# Patient Record
Sex: FEMALE | Employment: PART TIME
[De-identification: names, ages, dates, MRNs, and addresses within clinical notes are randomized per-mention and may not be internally consistent; named-entity substitution may affect disease eponyms.]

---

## 2020-01-31 LAB
HBV SURFACE AG SERPL QL IA: NEGATIVE
HIV 1+2 AB+HIV1 P24 AG SERPL QL IA: NEGATIVE
RUBELLA ABY IGG: NORMAL

## 2020-03-11 ENCOUNTER — HEALTH MAINTENANCE LETTER (OUTPATIENT)
Age: 30
End: 2020-03-11

## 2020-07-30 LAB — GROUP B STREP PCR: NEGATIVE

## 2020-08-24 DIAGNOSIS — Z34.90 ENCOUNTER FOR PLANNED INDUCTION OF LABOR: Primary | ICD-10-CM

## 2020-08-27 DIAGNOSIS — Z34.90 ENCOUNTER FOR PLANNED INDUCTION OF LABOR: ICD-10-CM

## 2020-08-27 PROCEDURE — U0003 INFECTIOUS AGENT DETECTION BY NUCLEIC ACID (DNA OR RNA); SEVERE ACUTE RESPIRATORY SYNDROME CORONAVIRUS 2 (SARS-COV-2) (CORONAVIRUS DISEASE [COVID-19]), AMPLIFIED PROBE TECHNIQUE, MAKING USE OF HIGH THROUGHPUT TECHNOLOGIES AS DESCRIBED BY CMS-2020-01-R: HCPCS | Performed by: OBSTETRICS & GYNECOLOGY

## 2020-08-28 LAB
SARS-COV-2 RNA SPEC QL NAA+PROBE: NOT DETECTED
SPECIMEN SOURCE: NORMAL

## 2020-08-29 ENCOUNTER — HOSPITAL ENCOUNTER (INPATIENT)
Facility: CLINIC | Age: 30
LOS: 4 days | Discharge: HOME | End: 2020-09-02
Attending: OBSTETRICS & GYNECOLOGY | Admitting: OBSTETRICS & GYNECOLOGY
Payer: COMMERCIAL

## 2020-08-29 LAB
ABO + RH BLD: NORMAL
ABO + RH BLD: NORMAL
SPECIMEN EXP DATE BLD: NORMAL

## 2020-08-29 PROCEDURE — 12000000 ZZH R&B MED SURG/OB

## 2020-08-29 PROCEDURE — 86901 BLOOD TYPING SEROLOGIC RH(D): CPT | Performed by: OBSTETRICS & GYNECOLOGY

## 2020-08-29 PROCEDURE — 86900 BLOOD TYPING SEROLOGIC ABO: CPT | Performed by: OBSTETRICS & GYNECOLOGY

## 2020-08-29 PROCEDURE — 25000132 ZZH RX MED GY IP 250 OP 250 PS 637: Performed by: OBSTETRICS & GYNECOLOGY

## 2020-08-29 PROCEDURE — 86780 TREPONEMA PALLIDUM: CPT | Performed by: OBSTETRICS & GYNECOLOGY

## 2020-08-29 RX ORDER — DIPHENHYDRAMINE HYDROCHLORIDE 50 MG/ML
25 INJECTION INTRAMUSCULAR; INTRAVENOUS EVERY 6 HOURS PRN
Status: DISCONTINUED | OUTPATIENT
Start: 2020-08-29 | End: 2020-09-02 | Stop reason: HOSPADM

## 2020-08-29 RX ORDER — METHYLERGONOVINE MALEATE 0.2 MG/ML
200 INJECTION INTRAVENOUS
Status: DISCONTINUED | OUTPATIENT
Start: 2020-08-29 | End: 2020-08-31 | Stop reason: CLARIF

## 2020-08-29 RX ORDER — IBUPROFEN 800 MG/1
800 TABLET, FILM COATED ORAL
Status: DISCONTINUED | OUTPATIENT
Start: 2020-08-29 | End: 2020-08-31 | Stop reason: CLARIF

## 2020-08-29 RX ORDER — DIPHENHYDRAMINE HCL 25 MG
25 CAPSULE ORAL EVERY 6 HOURS PRN
Status: DISCONTINUED | OUTPATIENT
Start: 2020-08-29 | End: 2020-09-02 | Stop reason: HOSPADM

## 2020-08-29 RX ORDER — NALOXONE HYDROCHLORIDE 0.4 MG/ML
.1-.4 INJECTION, SOLUTION INTRAMUSCULAR; INTRAVENOUS; SUBCUTANEOUS
Status: DISCONTINUED | OUTPATIENT
Start: 2020-08-29 | End: 2020-08-31

## 2020-08-29 RX ORDER — OXYCODONE AND ACETAMINOPHEN 5; 325 MG/1; MG/1
1 TABLET ORAL
Status: DISCONTINUED | OUTPATIENT
Start: 2020-08-29 | End: 2020-08-31 | Stop reason: CLARIF

## 2020-08-29 RX ORDER — OXYTOCIN 10 [USP'U]/ML
10 INJECTION, SOLUTION INTRAMUSCULAR; INTRAVENOUS
Status: DISCONTINUED | OUTPATIENT
Start: 2020-08-29 | End: 2020-08-31

## 2020-08-29 RX ORDER — TRANEXAMIC ACID 10 MG/ML
1 INJECTION, SOLUTION INTRAVENOUS EVERY 30 MIN PRN
Status: DISCONTINUED | OUTPATIENT
Start: 2020-08-29 | End: 2020-08-31 | Stop reason: CLARIF

## 2020-08-29 RX ORDER — ONDANSETRON 2 MG/ML
4 INJECTION INTRAMUSCULAR; INTRAVENOUS EVERY 6 HOURS PRN
Status: DISCONTINUED | OUTPATIENT
Start: 2020-08-29 | End: 2020-08-31

## 2020-08-29 RX ORDER — SODIUM CHLORIDE, SODIUM LACTATE, POTASSIUM CHLORIDE, CALCIUM CHLORIDE 600; 310; 30; 20 MG/100ML; MG/100ML; MG/100ML; MG/100ML
INJECTION, SOLUTION INTRAVENOUS CONTINUOUS
Status: DISCONTINUED | OUTPATIENT
Start: 2020-08-29 | End: 2020-08-31

## 2020-08-29 RX ORDER — LIDOCAINE 40 MG/G
CREAM TOPICAL
Status: DISCONTINUED | OUTPATIENT
Start: 2020-08-29 | End: 2020-08-31

## 2020-08-29 RX ORDER — CARBOPROST TROMETHAMINE 250 UG/ML
250 INJECTION, SOLUTION INTRAMUSCULAR
Status: DISCONTINUED | OUTPATIENT
Start: 2020-08-29 | End: 2020-08-31 | Stop reason: CLARIF

## 2020-08-29 RX ORDER — ACETAMINOPHEN 325 MG/1
650 TABLET ORAL EVERY 4 HOURS PRN
Status: DISCONTINUED | OUTPATIENT
Start: 2020-08-29 | End: 2020-08-31 | Stop reason: CLARIF

## 2020-08-29 RX ORDER — MISOPROSTOL 100 UG/1
25 TABLET ORAL
Status: DISCONTINUED | OUTPATIENT
Start: 2020-08-29 | End: 2020-08-30 | Stop reason: CLARIF

## 2020-08-29 RX ORDER — OXYTOCIN/0.9 % SODIUM CHLORIDE 30/500 ML
100-340 PLASTIC BAG, INJECTION (ML) INTRAVENOUS CONTINUOUS PRN
Status: COMPLETED | OUTPATIENT
Start: 2020-08-29 | End: 2020-08-30

## 2020-08-29 RX ORDER — FENTANYL CITRATE 50 UG/ML
50-100 INJECTION, SOLUTION INTRAMUSCULAR; INTRAVENOUS
Status: DISCONTINUED | OUTPATIENT
Start: 2020-08-29 | End: 2020-08-31 | Stop reason: CLARIF

## 2020-08-29 RX ADMIN — Medication 25 MCG: at 21:24

## 2020-08-29 RX ADMIN — Medication 25 MCG: at 23:40

## 2020-08-29 ASSESSMENT — ACTIVITIES OF DAILY LIVING (ADL)
TRANSFERRING: 0-->INDEPENDENT
BATHING: 0-->INDEPENDENT
TOILETING: 0-->INDEPENDENT
SWALLOWING: 0-->SWALLOWS FOODS/LIQUIDS WITHOUT DIFFICULTY
COGNITION: 0 - NO COGNITION ISSUES REPORTED
DRESS: 0-->INDEPENDENT
RETIRED_EATING: 0-->INDEPENDENT
FALL_HISTORY_WITHIN_LAST_SIX_MONTHS: NO
AMBULATION: 0-->INDEPENDENT
RETIRED_COMMUNICATION: 0-->UNDERSTANDS/COMMUNICATES WITHOUT DIFFICULTY

## 2020-08-29 ASSESSMENT — MIFFLIN-ST. JEOR: SCORE: 1881.21

## 2020-08-30 ENCOUNTER — ANESTHESIA EVENT (OUTPATIENT)
Dept: OBGYN | Facility: CLINIC | Age: 30
End: 2020-08-30
Payer: COMMERCIAL

## 2020-08-30 ENCOUNTER — ANESTHESIA (OUTPATIENT)
Dept: OBGYN | Facility: CLINIC | Age: 30
End: 2020-08-30
Payer: COMMERCIAL

## 2020-08-30 ENCOUNTER — ANESTHESIA EVENT (OUTPATIENT)
Dept: SURGERY | Facility: CLINIC | Age: 30
End: 2020-08-30
Payer: COMMERCIAL

## 2020-08-30 ENCOUNTER — ANESTHESIA (OUTPATIENT)
Dept: SURGERY | Facility: CLINIC | Age: 30
End: 2020-08-30
Payer: COMMERCIAL

## 2020-08-30 LAB — T PALLIDUM AB SER QL: NONREACTIVE

## 2020-08-30 PROCEDURE — 37000008 ZZH ANESTHESIA TECHNICAL FEE, 1ST 30 MIN: Performed by: OBSTETRICS & GYNECOLOGY

## 2020-08-30 PROCEDURE — 12000000 ZZH R&B MED SURG/OB

## 2020-08-30 PROCEDURE — 71000014 ZZH RECOVERY PHASE 1 LEVEL 2 FIRST HR: Performed by: OBSTETRICS & GYNECOLOGY

## 2020-08-30 PROCEDURE — 25000128 H RX IP 250 OP 636: Performed by: ANESTHESIOLOGY

## 2020-08-30 PROCEDURE — 40000671 ZZH STATISTIC ANESTHESIA CASE

## 2020-08-30 PROCEDURE — 36000058 ZZH SURGERY LEVEL 3 EA 15 ADDTL MIN: Performed by: OBSTETRICS & GYNECOLOGY

## 2020-08-30 PROCEDURE — 36000056 ZZH SURGERY LEVEL 3 1ST 30 MIN: Performed by: OBSTETRICS & GYNECOLOGY

## 2020-08-30 PROCEDURE — 25000125 ZZHC RX 250: Performed by: NURSE ANESTHETIST, CERTIFIED REGISTERED

## 2020-08-30 PROCEDURE — 25000128 H RX IP 250 OP 636: Performed by: OBSTETRICS & GYNECOLOGY

## 2020-08-30 PROCEDURE — 25000128 H RX IP 250 OP 636: Performed by: NURSE ANESTHETIST, CERTIFIED REGISTERED

## 2020-08-30 PROCEDURE — 25800030 ZZH RX IP 258 OP 636: Performed by: OBSTETRICS & GYNECOLOGY

## 2020-08-30 PROCEDURE — 25000125 ZZHC RX 250: Performed by: OBSTETRICS & GYNECOLOGY

## 2020-08-30 PROCEDURE — 00HU33Z INSERTION OF INFUSION DEVICE INTO SPINAL CANAL, PERCUTANEOUS APPROACH: ICD-10-PCS | Performed by: ANESTHESIOLOGY

## 2020-08-30 PROCEDURE — 37000011 ZZH ANESTHESIA WARD SERVICE

## 2020-08-30 PROCEDURE — 37000009 ZZH ANESTHESIA TECHNICAL FEE, EACH ADDTL 15 MIN: Performed by: OBSTETRICS & GYNECOLOGY

## 2020-08-30 PROCEDURE — 3E0R3BZ INTRODUCTION OF ANESTHETIC AGENT INTO SPINAL CANAL, PERCUTANEOUS APPROACH: ICD-10-PCS | Performed by: ANESTHESIOLOGY

## 2020-08-30 PROCEDURE — 25000132 ZZH RX MED GY IP 250 OP 250 PS 637: Performed by: OBSTETRICS & GYNECOLOGY

## 2020-08-30 PROCEDURE — 27210794 ZZH OR GENERAL SUPPLY STERILE: Performed by: OBSTETRICS & GYNECOLOGY

## 2020-08-30 RX ORDER — HYDROCORTISONE 2.5 %
CREAM (GRAM) TOPICAL 3 TIMES DAILY PRN
Status: DISCONTINUED | OUTPATIENT
Start: 2020-08-30 | End: 2020-09-02 | Stop reason: HOSPADM

## 2020-08-30 RX ORDER — SIMETHICONE 80 MG
80 TABLET,CHEWABLE ORAL 4 TIMES DAILY PRN
Status: DISCONTINUED | OUTPATIENT
Start: 2020-08-30 | End: 2020-09-02 | Stop reason: HOSPADM

## 2020-08-30 RX ORDER — OXYCODONE HYDROCHLORIDE 5 MG/1
5 TABLET ORAL EVERY 4 HOURS PRN
Status: DISCONTINUED | OUTPATIENT
Start: 2020-08-30 | End: 2020-09-02 | Stop reason: HOSPADM

## 2020-08-30 RX ORDER — ACETAMINOPHEN 325 MG/1
975 TABLET ORAL EVERY 6 HOURS
Status: DISCONTINUED | OUTPATIENT
Start: 2020-08-31 | End: 2020-09-02 | Stop reason: HOSPADM

## 2020-08-30 RX ORDER — NALOXONE HYDROCHLORIDE 0.4 MG/ML
.1-.4 INJECTION, SOLUTION INTRAMUSCULAR; INTRAVENOUS; SUBCUTANEOUS
Status: DISCONTINUED | OUTPATIENT
Start: 2020-08-30 | End: 2020-09-02 | Stop reason: HOSPADM

## 2020-08-30 RX ORDER — OXYTOCIN/0.9 % SODIUM CHLORIDE 30/500 ML
PLASTIC BAG, INJECTION (ML) INTRAVENOUS PRN
Status: DISCONTINUED | OUTPATIENT
Start: 2020-08-30 | End: 2020-08-30

## 2020-08-30 RX ORDER — AMOXICILLIN 250 MG
2 CAPSULE ORAL 2 TIMES DAILY
Status: DISCONTINUED | OUTPATIENT
Start: 2020-08-31 | End: 2020-09-02 | Stop reason: HOSPADM

## 2020-08-30 RX ORDER — OXYTOCIN 10 [USP'U]/ML
10 INJECTION, SOLUTION INTRAMUSCULAR; INTRAVENOUS
Status: DISCONTINUED | OUTPATIENT
Start: 2020-08-30 | End: 2020-09-02 | Stop reason: HOSPADM

## 2020-08-30 RX ORDER — KETOROLAC TROMETHAMINE 30 MG/ML
30 INJECTION, SOLUTION INTRAMUSCULAR; INTRAVENOUS EVERY 6 HOURS
Status: DISCONTINUED | OUTPATIENT
Start: 2020-08-31 | End: 2020-08-31

## 2020-08-30 RX ORDER — CEFAZOLIN SODIUM 2 G/100ML
2 INJECTION, SOLUTION INTRAVENOUS
Status: DISCONTINUED | OUTPATIENT
Start: 2020-08-30 | End: 2020-08-30 | Stop reason: HOSPADM

## 2020-08-30 RX ORDER — CEFAZOLIN SODIUM 1 G/3ML
INJECTION, POWDER, FOR SOLUTION INTRAMUSCULAR; INTRAVENOUS PRN
Status: DISCONTINUED | OUTPATIENT
Start: 2020-08-30 | End: 2020-08-30

## 2020-08-30 RX ORDER — ONDANSETRON 2 MG/ML
INJECTION INTRAMUSCULAR; INTRAVENOUS PRN
Status: DISCONTINUED | OUTPATIENT
Start: 2020-08-30 | End: 2020-08-30

## 2020-08-30 RX ORDER — FENTANYL CITRATE-0.9 % NACL/PF 10 MCG/ML
100 PLASTIC BAG, INJECTION (ML) INTRAVENOUS EVERY 5 MIN PRN
Status: DISCONTINUED | OUTPATIENT
Start: 2020-08-30 | End: 2020-08-31 | Stop reason: CLARIF

## 2020-08-30 RX ORDER — CEFAZOLIN SODIUM 1 G/3ML
1 INJECTION, POWDER, FOR SOLUTION INTRAMUSCULAR; INTRAVENOUS SEE ADMIN INSTRUCTIONS
Status: DISCONTINUED | OUTPATIENT
Start: 2020-08-30 | End: 2020-08-30 | Stop reason: HOSPADM

## 2020-08-30 RX ORDER — ONDANSETRON 2 MG/ML
4 INJECTION INTRAMUSCULAR; INTRAVENOUS EVERY 6 HOURS PRN
Status: DISCONTINUED | OUTPATIENT
Start: 2020-08-30 | End: 2020-08-31

## 2020-08-30 RX ORDER — MODIFIED LANOLIN
OINTMENT (GRAM) TOPICAL
Status: DISCONTINUED | OUTPATIENT
Start: 2020-08-30 | End: 2020-09-02 | Stop reason: HOSPADM

## 2020-08-30 RX ORDER — LIDOCAINE 40 MG/G
CREAM TOPICAL
Status: DISCONTINUED | OUTPATIENT
Start: 2020-08-30 | End: 2020-09-02 | Stop reason: HOSPADM

## 2020-08-30 RX ORDER — NALOXONE HYDROCHLORIDE 0.4 MG/ML
.1-.4 INJECTION, SOLUTION INTRAMUSCULAR; INTRAVENOUS; SUBCUTANEOUS
Status: DISCONTINUED | OUTPATIENT
Start: 2020-08-30 | End: 2020-08-31

## 2020-08-30 RX ORDER — NALBUPHINE HYDROCHLORIDE 20 MG/ML
2.5-5 INJECTION, SOLUTION INTRAMUSCULAR; INTRAVENOUS; SUBCUTANEOUS EVERY 6 HOURS PRN
Status: DISCONTINUED | OUTPATIENT
Start: 2020-08-30 | End: 2020-08-31 | Stop reason: CLARIF

## 2020-08-30 RX ORDER — IBUPROFEN 800 MG/1
800 TABLET, FILM COATED ORAL EVERY 6 HOURS
Status: DISCONTINUED | OUTPATIENT
Start: 2020-08-31 | End: 2020-08-31

## 2020-08-30 RX ORDER — SCOLOPAMINE TRANSDERMAL SYSTEM 1 MG/1
1 PATCH, EXTENDED RELEASE TRANSDERMAL
Status: DISCONTINUED | OUTPATIENT
Start: 2020-08-30 | End: 2020-08-31 | Stop reason: CLARIF

## 2020-08-30 RX ORDER — DEXTROSE, SODIUM CHLORIDE, SODIUM LACTATE, POTASSIUM CHLORIDE, AND CALCIUM CHLORIDE 5; .6; .31; .03; .02 G/100ML; G/100ML; G/100ML; G/100ML; G/100ML
INJECTION, SOLUTION INTRAVENOUS CONTINUOUS
Status: DISCONTINUED | OUTPATIENT
Start: 2020-08-31 | End: 2020-09-02 | Stop reason: HOSPADM

## 2020-08-30 RX ORDER — SODIUM CHLORIDE, SODIUM LACTATE, POTASSIUM CHLORIDE, CALCIUM CHLORIDE 600; 310; 30; 20 MG/100ML; MG/100ML; MG/100ML; MG/100ML
INJECTION, SOLUTION INTRAVENOUS CONTINUOUS
Status: DISCONTINUED | OUTPATIENT
Start: 2020-08-30 | End: 2020-08-30 | Stop reason: HOSPADM

## 2020-08-30 RX ORDER — CITRIC ACID/SODIUM CITRATE 334-500MG
SOLUTION, ORAL ORAL
Status: DISCONTINUED
Start: 2020-08-30 | End: 2020-08-30 | Stop reason: HOSPADM

## 2020-08-30 RX ORDER — BISACODYL 10 MG
10 SUPPOSITORY, RECTAL RECTAL DAILY PRN
Status: DISCONTINUED | OUTPATIENT
Start: 2020-09-01 | End: 2020-09-02 | Stop reason: HOSPADM

## 2020-08-30 RX ORDER — OXYTOCIN/0.9 % SODIUM CHLORIDE 30/500 ML
100 PLASTIC BAG, INJECTION (ML) INTRAVENOUS CONTINUOUS
Status: DISCONTINUED | OUTPATIENT
Start: 2020-08-31 | End: 2020-09-02 | Stop reason: HOSPADM

## 2020-08-30 RX ORDER — MORPHINE SULFATE 1 MG/ML
INJECTION, SOLUTION EPIDURAL; INTRATHECAL; INTRAVENOUS PRN
Status: DISCONTINUED | OUTPATIENT
Start: 2020-08-30 | End: 2020-08-30

## 2020-08-30 RX ORDER — OXYTOCIN/0.9 % SODIUM CHLORIDE 30/500 ML
340 PLASTIC BAG, INJECTION (ML) INTRAVENOUS CONTINUOUS PRN
Status: DISCONTINUED | OUTPATIENT
Start: 2020-08-30 | End: 2020-09-02 | Stop reason: HOSPADM

## 2020-08-30 RX ORDER — EPHEDRINE SULFATE 50 MG/ML
5 INJECTION, SOLUTION INTRAMUSCULAR; INTRAVENOUS; SUBCUTANEOUS
Status: DISCONTINUED | OUTPATIENT
Start: 2020-08-30 | End: 2020-08-31

## 2020-08-30 RX ORDER — TRANEXAMIC ACID 10 MG/ML
1 INJECTION, SOLUTION INTRAVENOUS EVERY 30 MIN PRN
Status: DISCONTINUED | OUTPATIENT
Start: 2020-08-30 | End: 2020-09-02 | Stop reason: HOSPADM

## 2020-08-30 RX ORDER — AMOXICILLIN 250 MG
1 CAPSULE ORAL 2 TIMES DAILY
Status: DISCONTINUED | OUTPATIENT
Start: 2020-08-31 | End: 2020-09-02 | Stop reason: HOSPADM

## 2020-08-30 RX ORDER — CITRIC ACID/SODIUM CITRATE 334-500MG
30 SOLUTION, ORAL ORAL
Status: COMPLETED | OUTPATIENT
Start: 2020-08-30 | End: 2020-08-30

## 2020-08-30 RX ADMIN — Medication 25 MCG: at 05:46

## 2020-08-30 RX ADMIN — Medication 25 MCG: at 10:08

## 2020-08-30 RX ADMIN — SODIUM CHLORIDE, POTASSIUM CHLORIDE, SODIUM LACTATE AND CALCIUM CHLORIDE: 600; 310; 30; 20 INJECTION, SOLUTION INTRAVENOUS at 15:32

## 2020-08-30 RX ADMIN — SODIUM CHLORIDE, POTASSIUM CHLORIDE, SODIUM LACTATE AND CALCIUM CHLORIDE: 600; 310; 30; 20 INJECTION, SOLUTION INTRAVENOUS at 17:45

## 2020-08-30 RX ADMIN — Medication 25 MCG: at 12:12

## 2020-08-30 RX ADMIN — OXYTOCIN-SODIUM CHLORIDE 0.9% IV SOLN 30 UNIT/500ML 20 ML: 30-0.9/5 SOLUTION at 21:51

## 2020-08-30 RX ADMIN — CEFAZOLIN 2 G: 1 INJECTION, POWDER, FOR SOLUTION INTRAMUSCULAR; INTRAVENOUS at 21:42

## 2020-08-30 RX ADMIN — MORPHINE SULFATE 4 MG: 1 INJECTION, SOLUTION EPIDURAL; INTRATHECAL; INTRAVENOUS at 21:53

## 2020-08-30 RX ADMIN — Medication 5 ML/HR: at 18:12

## 2020-08-30 RX ADMIN — Medication 25 MCG: at 07:59

## 2020-08-30 RX ADMIN — SODIUM CHLORIDE, POTASSIUM CHLORIDE, SODIUM LACTATE AND CALCIUM CHLORIDE: 600; 310; 30; 20 INJECTION, SOLUTION INTRAVENOUS at 21:31

## 2020-08-30 RX ADMIN — Medication 100 ML/HR: at 23:20

## 2020-08-30 RX ADMIN — AZITHROMYCIN MONOHYDRATE 500 MG: 500 INJECTION, POWDER, LYOPHILIZED, FOR SOLUTION INTRAVENOUS at 21:31

## 2020-08-30 RX ADMIN — Medication 25 MCG: at 14:09

## 2020-08-30 RX ADMIN — ONDANSETRON HYDROCHLORIDE 4 MG: 2 INJECTION, SOLUTION INTRAVENOUS at 21:34

## 2020-08-30 RX ADMIN — Medication 25 MCG: at 03:42

## 2020-08-30 RX ADMIN — Medication 25 MCG: at 01:33

## 2020-08-30 RX ADMIN — SODIUM CITRATE AND CITRIC ACID MONOHYDRATE 30 ML: 500; 334 SOLUTION ORAL at 21:16

## 2020-08-30 NOTE — ANESTHESIA PROCEDURE NOTES
Procedure note : epidural catheter  Staff -   Anesthesiologist:  Jacob Fatima DO      Performed By: anesthesiologist    Referred By: Earnest Espana MD    Pre-Procedure  Performed by Jacob Fatima DO  Referred by Earnest Espana MD  Location: OB      Pre-Anesthestic Checklist: patient identified, IV checked, risks and benefits discussed, informed consent, monitors and equipment checked, pre-op evaluation and at physician/surgeon's request    Timeout  Correct Patient: Yes   Correct Procedure: Yes   Correct Site: Yes   Correct Laterality: N/A   Correct Position: Yes   Site Marked: N/A   .   Procedure Documentation    .    Procedure: epidural catheter, .       .  .        Assessment/Narrative  .  .  . Comments:  .Diagnosis- Anticipated vaginal delivery    Continuous Labor Epidural, indication is for labor pain. Following an discussion of the expectations, benefits and risk (including but not limited to nerve damage, infection, bleeding, spinal headache, partial or failed block)--questions were encouraged and answered. The patient appears to understand, and consents to proceed.     The patient received a fluid bolus per orders    Time-out performed.     The patient was in the sitting position, a PVP prep times three was done in the lumbar area followed by placement of a sterile drape. The skin was then infiltrated with lidocaine. A 17ga Touhy needle was then advanced under a loss of resistance technique until the epidural space was identified. The epidural catheter was then advanced and secured. The catheter was then bolused in divided doses with Bupivicaine 0.25% 12 cc, while the patient/fetus was monitored. Infusion orders written and infusion of bupivicaine 0.125% + fentanyl 2mcg/cc 10cc per hour started. PCEA 5cc bolus ever 15 minutes, with a maximum of 20cc per hour.    I or my partner, was immediately available and frequently monitored at necessary intervals.      DONTE Fatima

## 2020-08-30 NOTE — ANESTHESIA PREPROCEDURE EVALUATION
"Anesthesia Pre-Procedure Evaluation    Patient: Mary Lester   MRN: [id number] : 1990          Preoperative Diagnosis: * No pre-op diagnosis entered *    * No procedures listed *    Past Medical History:   Diagnosis Date     Hypertension      Past Surgical History:   Procedure Laterality Date     ENT SURGERY      T & A, wisdom teeth     Anesthesia Evaluation       history and physical reviewed .             ROS/MED HX    ENT/Pulmonary:  - neg pulmonary ROS     Neurologic:  - neg neurologic ROS     Cardiovascular:  - neg cardiovascular ROS       METS/Exercise Tolerance:     Hematologic:         Musculoskeletal:         GI/Hepatic:  - neg GI/hepatic ROS       Renal/Genitourinary:         Endo:         Psychiatric:         Infectious Disease:         Malignancy:         Other:                     neg OB ROS            Physical Exam  Normal systems: cardiovascular, pulmonary and dental    Airway   Mallampati: II    Dental     Cardiovascular       Pulmonary     Other findings: .No lab results found.   No lab results found.        No results found for: WBC, HGB, HCT, PLT, CRP, SED, NA, POTASSIUM, CHLORIDE, CO2, BUN, CR, GLC, MICH, PHOS, MAG, ALBUMIN, PROTTOTAL, ALT, AST, GGT, ALKPHOS, BILITOTAL, BILIDIRECT, LIPASE, AMYLASE, SUDHA, PTT, INR, FIBR, TSH, T4, T3, HCG, HCGS, CKTOTAL, CKMB, TROPN    Preop Vitals  BP Readings from Last 3 Encounters:   20 127/66    Pulse Readings from Last 3 Encounters:   20 82      Resp Readings from Last 3 Encounters:   20    SpO2 Readings from Last 3 Encounters:   No data found for SpO2      Temp Readings from Last 1 Encounters:   20 98.3  F (36.8  C)    Ht Readings from Last 1 Encounters:   20 1.626 m (5' 4\")      Wt Readings from Last 1 Encounters:   20 116.1 kg (256 lb)    Estimated body mass index is 43.94 kg/m  as calculated from the following:    Height as of this encounter: 1.626 m (5' 4\").    Weight as of this encounter: 116.1 kg " (256 lb).       Anesthesia Plan  Procedure only, no anesthetic delivered    History & Physical Review      ASA Status:  3 .  OB Epidural Asa: 3       Plan for Epidural            Postoperative Care      Consents  Anesthetic plan, risks, benefits and alternatives discussed with:  Patient..                 Jacob Fatima DO                    .

## 2020-08-30 NOTE — PROVIDER NOTIFICATION
08/30/20 0842   Provider Notification   Provider Name/Title Dr. GEORGES Espana   Method of Notification At Bedside   Notification Reason Other (Comment)  (meet Pt. and bedside US Head down)

## 2020-08-30 NOTE — PROVIDER NOTIFICATION
08/30/20 1100   Provider Notification   Provider Name/Title Dr. GEORGES Espana   Method of Notification Phone   Request Evaluate - Remote   Notification Reason SVE;Status Update;Labor Status;Membrane Status;Uterine Activity   patient reports SROM clear blood tinged fluid on bathroom floor and running down patients legs.  SVE done and slight change in cervix 1\/70\-2  [place] of 6 POC to continue with cytotec oral as long as contractions do not become to close.

## 2020-08-30 NOTE — PLAN OF CARE
Patient arrived to L and D unit at 1935 for induction of labor related to gestational HTN.  Denies leakage of fluid, vaginal bleeding, headache, epigastric pain, visual disturbances.  Fetal movement present.  External monitors applied.  Physical assessment and health history taken.  Admission questions answered and bill of rights reviewed.  PLAN:  Orders from Dr. Abbott.

## 2020-08-30 NOTE — PROVIDER NOTIFICATION
08/30/20 1827   Provider Notification   Provider Name/Title Dr. GEORGES Espana   Method of Notification At Bedside   Request Evaluate in Person   Notification Reason Other (Comment)  (verify infant position)   With last SVE unable to verify position so Bedside US done per Dr. GEORGES Espana.  Cephalic position verified

## 2020-08-30 NOTE — PROVIDER NOTIFICATION
08/29/20 2108   Provider Notification   Provider Name/Title Dr. Abbott   Method of Notification Phone;Electronic Page   Request Evaluate - Remote   Notification Reason Patient Arrived;Uterine Activity;Status Update;SVE   Dr. Abbott updated of patient's arrival and need for orders for induction. SVE 1/50/-2. Category 1 tracing.

## 2020-08-30 NOTE — PROGRESS NOTES
Met with Hussein.  Pt here for IOL secondary to chronic HTN.  So far has received cytotec with minimal contractions  EFM Category one  Bedside US shows vertex    A/P  Continue cytotec and reeval in 6 hours

## 2020-08-30 NOTE — H&P
Labor & Delivery History & Physical  Patient Name:  Mary Lester   MRN:  [id number]  Age:  29 year old    YOB: 1990      Subjective:    Mary Lester is a 29 year old  female with ZO: 2020, by Patient Reported,  Gestational Age: 39w0d who presents for IOL for GHTN.  She denies any regular contractions.     Patient denies leaking of fluid or vaginal bleeding.  Fetal Movement: present    Prenatal care complicated by:  - GHTN: normotensive since 29+ wga. Baseline tox labs normal (). EFW at 35+ wga was 52%ile with AC 95%ile  - Obesity  - A pos  - GBS neg      Pregnancy Episode Problems (from 20 to present)     No problems associated with this episode.        OB History    Para Term  AB Living   1 0 0 0 0 0   SAB TAB Ectopic Multiple Live Births   0 0 0 0 0      # Outcome Date GA Lbr Jayden/2nd Weight Sex Delivery Anes PTL Lv   1 Current              Past Medical History:   Diagnosis Date     Hypertension      Past Surgical History:   Procedure Laterality Date     ENT SURGERY      T & A, wisdom teeth     Social History     Tobacco Use     Smoking status: Never Smoker     Smokeless tobacco: Never Used   Substance Use Topics     Alcohol use: Not Currently     Drug use: Never      History   Drug Use Unknown     History reviewed. No pertinent family history.    Medications Prior to Admission   Medication Sig Dispense Refill Last Dose     omeprazole (PRILOSEC) 20 MG DR capsule Take 20 mg by mouth daily        Prenatal Vit-Fe Fumarate-FA (PRENATAL VITAMIN PO) 1 tablet by Oral or Feeding Tube route daily          There is no immunization history on file for this patient.    Review of Systems - NEGATIVE FOR  Fevers  Chills  Headache  Visual changes  Ear pain  Sore throat  Cough  Shortness of breath  Chest pain  Palpitations  Constipation  Diarrhea  Vomiting  Bloody stools  Hematuria  Dysuria  Muscle weakness  Gait disturbance    Objective:  Temp:  [98.2  F (36.8  C)]  98.2  F (36.8  C)  Pulse:  [82] 82  Resp:  [16] 16  BP: (135)/(68) 135/68  256 lbs 0 oz      General: General appearance: NAD    Lungs:   unlabored   Heart:     Abdomen: Gravid, obese, nontender   [place]: Contraction Frequency (min): irregular  Contraction Duration (sec):     FHT: Baseline 150 BPM   Fetal heart variability:moderate  Fetal heart rate accelerations:  Present 15 x 15  Fetal heart rate decelerations: none  Fetal heart rate interpretation:  Category I   Speculum:     Cervix: Dilation:   FT  Effacement:     50  Station:  -2    Exam per nurse    Extremities: Trace to 1+ edema bilateral, symmetric edema; neg Maritza's sign      Lab Review:    No results found for: HGB, HCT, RPR, HEPBSAG, TSH        Assessment  Mary Lester is a 29 year old  female with ZO: 2020, by Patient Reported,  Gestational Age: 39w0d who presents for IOL for GHTN    Plan  - IOL: given unfavorable and high cervix,will ripen with cytotec PO.  - FHT: cat I. Cont CEFM.   - GHTN: normotensive since 29+ wga. Baseline tox labs normal (). EFW at 35+ wga was 52%ile with AC 95%ile  - Obesity  - A pos  - GBS neg  - Postpartum contraception: undecided; considering LARCs  - Anticipate

## 2020-08-31 LAB — HGB BLD-MCNC: 10.9 G/DL (ref 11.7–15.7)

## 2020-08-31 PROCEDURE — 25000132 ZZH RX MED GY IP 250 OP 250 PS 637: Performed by: OBSTETRICS & GYNECOLOGY

## 2020-08-31 PROCEDURE — 85018 HEMOGLOBIN: CPT | Performed by: OBSTETRICS & GYNECOLOGY

## 2020-08-31 PROCEDURE — 25000128 H RX IP 250 OP 636: Performed by: OBSTETRICS & GYNECOLOGY

## 2020-08-31 PROCEDURE — 36415 COLL VENOUS BLD VENIPUNCTURE: CPT | Performed by: OBSTETRICS & GYNECOLOGY

## 2020-08-31 PROCEDURE — 25000125 ZZHC RX 250: Performed by: OBSTETRICS & GYNECOLOGY

## 2020-08-31 PROCEDURE — 12000000 ZZH R&B MED SURG/OB

## 2020-08-31 RX ORDER — LABETALOL 20 MG/4 ML (5 MG/ML) INTRAVENOUS SYRINGE
10
Status: DISCONTINUED | OUTPATIENT
Start: 2020-08-31 | End: 2020-08-31 | Stop reason: HOSPADM

## 2020-08-31 RX ORDER — ONDANSETRON 4 MG/1
4 TABLET, ORALLY DISINTEGRATING ORAL EVERY 6 HOURS PRN
Status: DISCONTINUED | OUTPATIENT
Start: 2020-08-31 | End: 2020-08-31 | Stop reason: CLARIF

## 2020-08-31 RX ORDER — MORPHINE SULFATE 1 MG/ML
4 INJECTION, SOLUTION EPIDURAL; INTRATHECAL; INTRAVENOUS ONCE
Status: DISCONTINUED | OUTPATIENT
Start: 2020-08-31 | End: 2020-08-31 | Stop reason: CLARIF

## 2020-08-31 RX ORDER — NALOXONE HYDROCHLORIDE 0.4 MG/ML
.1-.4 INJECTION, SOLUTION INTRAMUSCULAR; INTRAVENOUS; SUBCUTANEOUS
Status: DISCONTINUED | OUTPATIENT
Start: 2020-08-31 | End: 2020-08-31

## 2020-08-31 RX ORDER — ONDANSETRON 2 MG/ML
4 INJECTION INTRAMUSCULAR; INTRAVENOUS EVERY 6 HOURS PRN
Status: DISCONTINUED | OUTPATIENT
Start: 2020-08-31 | End: 2020-08-31 | Stop reason: CLARIF

## 2020-08-31 RX ORDER — NALBUPHINE HYDROCHLORIDE 20 MG/ML
2.5-5 INJECTION, SOLUTION INTRAMUSCULAR; INTRAVENOUS; SUBCUTANEOUS EVERY 6 HOURS PRN
Status: DISCONTINUED | OUTPATIENT
Start: 2020-08-31 | End: 2020-08-31 | Stop reason: CLARIF

## 2020-08-31 RX ORDER — SODIUM CHLORIDE, SODIUM LACTATE, POTASSIUM CHLORIDE, CALCIUM CHLORIDE 600; 310; 30; 20 MG/100ML; MG/100ML; MG/100ML; MG/100ML
INJECTION, SOLUTION INTRAVENOUS CONTINUOUS
Status: DISCONTINUED | OUTPATIENT
Start: 2020-08-31 | End: 2020-08-31 | Stop reason: HOSPADM

## 2020-08-31 RX ORDER — FERROUS SULFATE 325(65) MG
325 TABLET ORAL DAILY
Status: DISCONTINUED | OUTPATIENT
Start: 2020-08-31 | End: 2020-09-02 | Stop reason: HOSPADM

## 2020-08-31 RX ORDER — ONDANSETRON 2 MG/ML
4 INJECTION INTRAMUSCULAR; INTRAVENOUS EVERY 30 MIN PRN
Status: DISCONTINUED | OUTPATIENT
Start: 2020-08-31 | End: 2020-08-31

## 2020-08-31 RX ORDER — IBUPROFEN 800 MG/1
800 TABLET, FILM COATED ORAL EVERY 6 HOURS
Status: DISCONTINUED | OUTPATIENT
Start: 2020-08-31 | End: 2020-09-02 | Stop reason: HOSPADM

## 2020-08-31 RX ORDER — ONDANSETRON 4 MG/1
4 TABLET, ORALLY DISINTEGRATING ORAL EVERY 30 MIN PRN
Status: DISCONTINUED | OUTPATIENT
Start: 2020-08-31 | End: 2020-08-31

## 2020-08-31 RX ORDER — HYDRALAZINE HYDROCHLORIDE 20 MG/ML
2.5-5 INJECTION INTRAMUSCULAR; INTRAVENOUS EVERY 10 MIN PRN
Status: DISCONTINUED | OUTPATIENT
Start: 2020-08-31 | End: 2020-08-31 | Stop reason: HOSPADM

## 2020-08-31 RX ORDER — LIDOCAINE 40 MG/G
CREAM TOPICAL
Status: DISCONTINUED | OUTPATIENT
Start: 2020-08-31 | End: 2020-08-31

## 2020-08-31 RX ADMIN — ACETAMINOPHEN 975 MG: 325 TABLET, FILM COATED ORAL at 02:32

## 2020-08-31 RX ADMIN — KETOROLAC TROMETHAMINE 30 MG: 30 INJECTION, SOLUTION INTRAMUSCULAR at 05:19

## 2020-08-31 RX ADMIN — ACETAMINOPHEN 975 MG: 325 TABLET, FILM COATED ORAL at 20:54

## 2020-08-31 RX ADMIN — ACETAMINOPHEN 975 MG: 325 TABLET, FILM COATED ORAL at 08:39

## 2020-08-31 RX ADMIN — ACETAMINOPHEN 975 MG: 325 TABLET, FILM COATED ORAL at 14:31

## 2020-08-31 RX ADMIN — IBUPROFEN 800 MG: 800 TABLET ORAL at 17:38

## 2020-08-31 RX ADMIN — DOCUSATE SODIUM 50 MG AND SENNOSIDES 8.6 MG 1 TABLET: 8.6; 5 TABLET, FILM COATED ORAL at 20:54

## 2020-08-31 RX ADMIN — Medication 100 ML/HR: at 01:30

## 2020-08-31 RX ADMIN — FERROUS SULFATE TAB 325 MG (65 MG ELEMENTAL FE) 325 MG: 325 (65 FE) TAB at 10:33

## 2020-08-31 RX ADMIN — KETOROLAC TROMETHAMINE 30 MG: 30 INJECTION, SOLUTION INTRAMUSCULAR at 11:38

## 2020-08-31 RX ADMIN — IBUPROFEN 800 MG: 800 TABLET ORAL at 23:46

## 2020-08-31 RX ADMIN — DOCUSATE SODIUM 50 MG AND SENNOSIDES 8.6 MG 1 TABLET: 8.6; 5 TABLET, FILM COATED ORAL at 08:39

## 2020-08-31 RX ADMIN — SODIUM CHLORIDE, SODIUM LACTATE, POTASSIUM CHLORIDE, CALCIUM CHLORIDE AND DEXTROSE MONOHYDRATE: 5; 600; 310; 30; 20 INJECTION, SOLUTION INTRAVENOUS at 06:21

## 2020-08-31 NOTE — LACTATION NOTE
This note was copied from a baby's chart.  LC visit and assistance provided with infant latch.  Hickey marking noted on both breasts from initial latching.  Mary has flat nipples and they flatten quickly after nipple roll and nipple everter.  Infant tried to latch but was unsuccessful.  LC recommended nipple shield use. 20 mm shield given to start with.  Infant has a tight suck pattern and does not open wide enough for the 24 mm shield.  However, later today, LC recommended trying to size up. All questions answered. Plan for follow up tomorrow.  Good suck pattern and occasional swallows with this nursing session.  Nasal stuffiness also observed and discussed with RN.

## 2020-08-31 NOTE — PROVIDER NOTIFICATION
08/30/20 1921   Provider Notification   Provider Name/Title Dr. GEORGES Espana   Method of Notification At Bedside   Request Evaluate in Person   Notification Reason Decels   Dr. Espana updated of decel. Came to bedside and placed.

## 2020-08-31 NOTE — PLAN OF CARE
Patient has tried to rest between checks and breast feeding.  at bedside and supportive. Toradol and Tylenol scheduled for discomfort. Patient reporting pain at 2/10. Incision is covered with dressing. No nausea or vomiting. Will attempt to get up prior to shift change.

## 2020-08-31 NOTE — ANESTHESIA POSTPROCEDURE EVALUATION
Patient: Mary Lester    Procedure(s):   SECTION    Diagnosis:Delivery of pregnancy by  section [O82]  Diagnosis Additional Information: No value filed.    Anesthesia Type:  Epidural    Note:  Anesthesia Post Evaluation    Patient location during evaluation: PACU  Patient participation: Able to fully participate in evaluation  Level of consciousness: awake  Pain management: adequate  Airway patency: patent  Cardiovascular status: acceptable  Respiratory status: acceptable  Hydration status: acceptable  PONV: none             Last vitals:  Vitals:    20 1941 20   BP: 107/65 112/60 118/61   Pulse:      Resp:      Temp:      SpO2:            Electronically Signed By: Chavez Montalvo MD  2020  10:43 PM

## 2020-08-31 NOTE — PLAN OF CARE
Vital signs stable.  Pain managed with Tylenol, Toradol and Ibuprofen. Breastfeeding.   at bedside and supportive. Bonding well with baby.  Will continue to monitor.     Care continued from 1500 - 1900:  Garg removed this morning, pt voiding without difficulty.  Pt ambulating well in room and has minimal pain.  Incision remains covered with dressing, no drainage or signs/symptoms of infection noted.  remains supportive at bedside.

## 2020-08-31 NOTE — ANESTHESIA POSTPROCEDURE EVALUATION
Patient: Mary Lester    * No procedures listed *    Diagnosis:* No pre-op diagnosis entered *  Diagnosis Additional Information: No value filed.    Anesthesia Type:  Epidural    Note:  Anesthesia Post Evaluation    Patient location during evaluation: PACU  Patient participation: Able to fully participate in evaluation  Level of consciousness: awake  Pain management: adequate  Airway patency: patent  Cardiovascular status: acceptable  Respiratory status: acceptable  Hydration status: acceptable  PONV: controlled     Anesthetic complications: None    Comments: .Labor Epidural Post delivery note.      Doing well. VSS Temp normal. Satisfactory respiratory and cardiovascular function. Return of neurologic function. Questions encouraged and answered. Denies positional headache. Minimal side effects easily managed w/ PRN meds. No apparent anesthetic complications. No follow-up required.    I or my partner was immediately available for epidural management.    DONTE Fatima            Last vitals:  Vitals:    08/31/20 0330 08/31/20 0435 08/31/20 0520   BP: 130/62 121/54 122/71   Pulse: 84 72 70   Resp: 16  20   Temp:      SpO2: 99% 99% 97%         Electronically Signed By: Jacob Fatima DO  August 31, 2020  8:21 AM

## 2020-08-31 NOTE — PLAN OF CARE
Data: Mary Lester transferred to room 428 via cart at 0049. Baby transferred via parent's arms.  Action: Receiving unit notified of transfer: Yes. Patient and family notified of room change. Belongings sent to receiving unit. Accompanied by Registered Nurse. Oriented patient to surroundings. Call light within reach. ID bands double-checked with receiving RN.  Response: Patient tolerated transfer and is stable.

## 2020-08-31 NOTE — ANESTHESIA CARE TRANSFER NOTE
Patient: Mary Lester    Procedure(s):   SECTION    Diagnosis: Delivery of pregnancy by  section [O82]  Diagnosis Additional Information: No value filed.    Anesthesia Type:   Epidural     Note:  Airway :Room Air  Patient transferred to:Labor and Delivery  Handoff Report: Identifed the Patient, Identified the Reponsible Provider, Reviewed the pertinent medical history, Discussed the surgical course, Reviewed Intra-OP anesthesia mangement and issues during anesthesia, Set expectations for post-procedure period and Allowed opportunity for questions and acknowledgement of understanding      Vitals: (Last set prior to Anesthesia Care Transfer)    CRNA VITALS  2020 2159 - 2020 2237      2020             NIBP:  (!) 127/111    NIBP Mean:  116                Electronically Signed By: IVANNA Hoyos CRNA  2020  10:37 PM

## 2020-08-31 NOTE — PROVIDER NOTIFICATION
08/30/20 1919   Provider Notification   Provider Name/Title Dr. GEORGES Espana   Method of Notification At Bedside   Request Evaluate in Person   Notification Reason Decels   FHR strip reviewed and POC to place internals late decels and variable decels recurant  With contractions

## 2020-08-31 NOTE — PROGRESS NOTES
[facility]  POD#1      Pt doing well. Ambulating, tolerating PO. Decreased lochia. Pain controlled. Garg removed, pt voiding without difficulties. Denies feeling dizzy, LH, CP, or HA.     Vitals:    20 0330 20 0435 20 0520 20 0808   BP: 130/62 121/54 122/71 97/66   Pulse: 84 72 70 75   Resp: 16  20 18   Temp:    98.1  F (36.7  C)   TempSrc:    Tympanic   SpO2: 99% 99% 97% 98%   Weight:       Height:         Abd soft, NTGR, FFBU, no fundal tenderness, incision well approximated with sutures, OR dressing dry and clean in place, will remove this evening  Ext no edema, no cyanosis, pulses +    Lab Results   Component Value Date    HGB 10.9 2020       A/P 29 year old  at 39w1d s/p pLTCS (fetal intolerance to labor) POD# 1. History of CHTN, Obesity,     -hemodynamically stable   - ABLA - ferrous sulfate  -Diet regular  -Pain control with Tylenol, Motrin and Oxycodone  -DVT ppx - SCDs while on bed and ambulation  -bowel ppx- Senna/docusate, simethicone  -Breast feeding  -Tdap and flu given prenatally  -Rubella immune  -BC; not discussed    Plan; continue routine PP care. Anticipate discharge home on POD#2 or 3    Dr. Cecilia Escalona  [phone]  2020 8:54 AM

## 2020-08-31 NOTE — BRIEF OP NOTE
[facility] Brief Operative Note    Pre-operative diagnosis: Delivery of pregnancy by  section [O82]   Post-operative diagnosis * No post-op diagnosis entered *    Term pregnancy  CHTN  Fetal intolerance of labor     Procedure: Procedure(s):   SECTION   Surgeon(s): Surgeon(s) and Role:     * Earnest Espana - Primary   Estimated blood loss: * No values recorded between 2020  9:46 PM and 2020 10:34 PM *    Specimens: ID Type Source Tests Collected by Time Destination   1 :  Cord blood Blood OR DOCUMENTATION ONLY Earnest Espana 2020 10:06 PM    2 :  Blood, arterial Blood SEE PROVIDERS ORDERS Earnest Espana 2020 10:07 PM    3 :  Blood, venous Blood SEE PROVIDERS ORDERS Earnest Espana 2020 10:07 PM       Findings: Viable baby boy

## 2020-08-31 NOTE — OP NOTE
Procedure Date: 2020      PREOPERATIVE DIAGNOSES:   1.  Pregnancy at term.   2.  History of chronic hypertension.   3.  Fetal nontender intolerance of labor.      POSTOPERATIVE DIAGNOSES:   1.  Pregnancy at term.   2.  History of chronic hypertension.   3.  Fetal nontender intolerance of labor.        PROCEDURE:  Primary lower low-segment transverse .      SURGEON:  Earnest Espana MD      ANESTHESIA:  Epidural.      COMPLICATIONS:  None.      DRAINS, PACKS:  None.      ESTIMATED BLOOD LOSS: 519.      URINE OUTPUT:  400 mL.      SPECIMENS:  None.        Cord gases pending.      INDICATIONS:  Ms. Lester is a 29-year-old,  who presented for induction for chronic hypertension and underwent 5 doses of Cytotec and began dilating and gian at a proper interval.  Cervix was 1 cm; however, baby had multiple decels including recurrent decels in spite of position management, fluid hydration.  Decision was decided to have a .      DESCRIPTION OF PROCEDURE:  After adequate epidural anesthesia, the patient was prepped and draped in the usual sterile fashion.  I placed in the left lateral tilt position.  Pfannenstiel skin incision made approximately 2 cm above the symphysis, carried down sharply through the subcutaneous tissue.  The fascia was incised bilaterally in curvilinear fashion.  Rectus muscle  in the midline, anterior peritoneum identified, incised vertically, superiorly and inferiorly to the upper edge of the bladder.      The vesicouterine peritoneum identified, incised bilaterally in curvilinear fashion.  Horizontal uterine incision was made.  Baby delivered from asynclitic transverse position.  Mouth and nares were suctioned, cord doubly clamped and cut and baby passed off to the Pediatric Team in attendance with findings as noted above.      The placenta was spontaneously extracted.  Uterus exteriorized, wiped free of remaining clot and membrane closed in 2 layers of 0  Vicryl, followed by 0 PDS.  Good hemostasis had and the uterus was replaced in the pelvis and inspected and noted to be hemostatic still.  Anterior peritoneum was then closed with 3-0 Vicryl.  Fascia inspected, closed with 0 Vicryl.  Subcutaneous tissue irrigated with warm normal saline solution and skin edges reapproximated with 4-0 Vicryl.      Estimated blood loss 519.  Sponge and needle count correct at the conclusion of the case.  The patient tolerated the procedure well and was transferred to Recovery Room in satisfactory condition.  Baby with the family.         JESUS MANUEL SELBY MD             D: 2020   T: 2020   MT: YOVANY      Name:     GORDO TREVIZO   MRN:      [id number]        Account:        [id number]   :      1990           Procedure Date: 2020      Document: [id number]

## 2020-08-31 NOTE — PLAN OF CARE
Patient had repetitive late decels with  On lasting 5 minutes with lilibeth down to 40 with little improvement with position changes and IV bolus. Dr. Espana called to bedside to evaluate.

## 2020-08-31 NOTE — ANESTHESIA PREPROCEDURE EVALUATION
"Anesthesia Pre-Procedure Evaluation    Patient: Mary Lester   MRN: [id number] : 1990          Preoperative Diagnosis: Delivery of pregnancy by  section [O82]    Procedure(s):   SECTION    Past Medical History:   Diagnosis Date     Hypertension      Past Surgical History:   Procedure Laterality Date     ENT SURGERY      T & A, wisdom teeth     Anesthesia Evaluation     . Pt has had prior anesthetic.     No history of anesthetic complications          ROS/MED HX    ENT/Pulmonary:       Neurologic:       Cardiovascular:         METS/Exercise Tolerance:     Hematologic:         Musculoskeletal:         GI/Hepatic:         Renal/Genitourinary:         Endo:         Psychiatric:         Infectious Disease:         Malignancy:         Other:    (+) Possibly pregnant                         Physical Exam      Airway   Mallampati: II  TM distance: >3 FB  Neck ROM: full    Dental     Cardiovascular       Pulmonary             No results found for: WBC, HGB, HCT, PLT, CRP, SED, NA, POTASSIUM, CHLORIDE, CO2, BUN, CR, GLC, MICH, PHOS, MAG, ALBUMIN, PROTTOTAL, ALT, AST, GGT, ALKPHOS, BILITOTAL, BILIDIRECT, LIPASE, AMYLASE, SUDHA, PTT, INR, FIBR, TSH, T4, T3, HCG, HCGS, CKTOTAL, CKMB, TROPN    Preop Vitals  BP Readings from Last 3 Encounters:   20 118/61    Pulse Readings from Last 3 Encounters:   20 82      Resp Readings from Last 3 Encounters:   20    SpO2 Readings from Last 3 Encounters:   20 97%      Temp Readings from Last 1 Encounters:   20 98.2  F (36.8  C) (Oral)    Ht Readings from Last 1 Encounters:   20 1.626 m (5' 4\")      Wt Readings from Last 1 Encounters:   20 116.1 kg (256 lb)    Estimated body mass index is 43.94 kg/m  as calculated from the following:    Height as of this encounter: 1.626 m (5' 4\").    Weight as of this encounter: 116.1 kg (256 lb).       Anesthesia Plan      History & Physical Review  History and physical reviewed and " following examination; no interval change.    ASA Status:  2 emergent.    NPO Status:  Waived due to emergency    Plan for Epidural     Epidural in situ, working well GETA as backup, duramorph for POP        Postoperative Care  Postoperative pain management:  Neuraxial analgesia.      Consents  Anesthetic plan, risks, benefits and alternatives discussed with:  Patient..                 Chavez Montalvo MD                    .

## 2020-09-01 PROCEDURE — 25000132 ZZH RX MED GY IP 250 OP 250 PS 637: Performed by: OBSTETRICS & GYNECOLOGY

## 2020-09-01 PROCEDURE — 12000000 ZZH R&B MED SURG/OB

## 2020-09-01 RX ORDER — SIMETHICONE 80 MG
80 TABLET,CHEWABLE ORAL 4 TIMES DAILY PRN
COMMUNITY
Start: 2020-09-01

## 2020-09-01 RX ORDER — FERROUS SULFATE 325(65) MG
325 TABLET ORAL DAILY
Qty: 30 TABLET | Refills: 3 | Status: SHIPPED | OUTPATIENT
Start: 2020-09-02

## 2020-09-01 RX ORDER — OXYCODONE HYDROCHLORIDE 5 MG/1
5 TABLET ORAL EVERY 4 HOURS PRN
Qty: 20 TABLET | Refills: 0 | Status: SHIPPED | OUTPATIENT
Start: 2020-09-01

## 2020-09-01 RX ORDER — DIPHENHYDRAMINE HCL 25 MG
25 CAPSULE ORAL EVERY 6 HOURS PRN
COMMUNITY
Start: 2020-09-01

## 2020-09-01 RX ORDER — ACETAMINOPHEN 325 MG/1
975 TABLET ORAL EVERY 6 HOURS
COMMUNITY
Start: 2020-09-01

## 2020-09-01 RX ORDER — HYDROCORTISONE 2.5 %
CREAM (GRAM) TOPICAL 3 TIMES DAILY PRN
COMMUNITY
Start: 2020-09-01

## 2020-09-01 RX ORDER — BISACODYL 10 MG
10 SUPPOSITORY, RECTAL RECTAL DAILY PRN
COMMUNITY
Start: 2020-09-01

## 2020-09-01 RX ORDER — IBUPROFEN 800 MG/1
800 TABLET, FILM COATED ORAL EVERY 6 HOURS
Qty: 30 TABLET | Refills: 3 | Status: SHIPPED | OUTPATIENT
Start: 2020-09-01

## 2020-09-01 RX ORDER — AMOXICILLIN 250 MG
1 CAPSULE ORAL 2 TIMES DAILY
Qty: 30 TABLET | Refills: 3 | Status: SHIPPED | OUTPATIENT
Start: 2020-09-01

## 2020-09-01 RX ORDER — MODIFIED LANOLIN
OINTMENT (GRAM) TOPICAL
COMMUNITY
Start: 2020-09-01

## 2020-09-01 RX ADMIN — ACETAMINOPHEN 975 MG: 325 TABLET, FILM COATED ORAL at 22:03

## 2020-09-01 RX ADMIN — IBUPROFEN 800 MG: 800 TABLET ORAL at 16:11

## 2020-09-01 RX ADMIN — ACETAMINOPHEN 975 MG: 325 TABLET, FILM COATED ORAL at 16:11

## 2020-09-01 RX ADMIN — DOCUSATE SODIUM 50 MG AND SENNOSIDES 8.6 MG 1 TABLET: 8.6; 5 TABLET, FILM COATED ORAL at 08:31

## 2020-09-01 RX ADMIN — FERROUS SULFATE TAB 325 MG (65 MG ELEMENTAL FE) 325 MG: 325 (65 FE) TAB at 08:31

## 2020-09-01 RX ADMIN — IBUPROFEN 800 MG: 800 TABLET ORAL at 08:31

## 2020-09-01 RX ADMIN — IBUPROFEN 800 MG: 800 TABLET ORAL at 22:03

## 2020-09-01 RX ADMIN — ACETAMINOPHEN 975 MG: 325 TABLET, FILM COATED ORAL at 03:07

## 2020-09-01 RX ADMIN — DOCUSATE SODIUM 50 MG AND SENNOSIDES 8.6 MG 1 TABLET: 8.6; 5 TABLET, FILM COATED ORAL at 20:49

## 2020-09-01 RX ADMIN — ACETAMINOPHEN 975 MG: 325 TABLET, FILM COATED ORAL at 10:14

## 2020-09-01 NOTE — PLAN OF CARE
Data: Vital signs within normal limits. Postpartum checks within normal limits - see flow record. Patient eating and drinking normally. Patient able to empty bladder independently and is up ambulating. Encouraged to increase ambulation.No apparent signs of infection. Incision dressing intact, will remove dressing today , patient is planning to shower this morning.Patient performing self cares and is able to care for infant. Dressing removed by patient , steri strips intact ,incisioin  well approximated.   Action: Patient medicated during the shift for pain. See MAR. Patient reassessed within 1 hour after each medication and pain was improved - patient stated she was comfortable. Patient education done about . See flow record.  Response: Positive attachment behaviors observed with infant. Support persons spouse present.   Plan: Anticipate discharge on 9/2

## 2020-09-01 NOTE — PLAN OF CARE
VSS, voiding without difficulty. Pain well-controlled with Tylenol and ibuprofen. Up ad viviana. Independent with self and  cares. Breastfeeding well with a latch score of 9.  present and very supportive. Planning to discharge .

## 2020-09-01 NOTE — PROGRESS NOTES
Patient Name:  Mary Lester   MRN:  [id number]  Age:  29 year old    YOB: 1990      POSTPARTUM/POST-OPERATIVE PROGRESS NOTE    Pt is POD#2 s/p C/S.  She is doing well without complaints.  Pt is ambulating and tolerating a regular diet.  Garg is out and she is voiding without complication.  Pain is well controlled with PO medication and lochia is within normal limits.  She is breastfeeding.  Baby is doing well.      Objective:    Temp:  [97.9  F (36.6  C)-98.1  F (36.7  C)] 97.9  F (36.6  C)  Pulse:  [75-82] 79  Resp:  [16-18] 18  BP: ()/(60-70) 117/60  SpO2:  [98 %-99 %] 99 %  256 lbs 0 oz      General Appearance:  NAD, A&O x 3, comfortable  Lungs:  unlabored  Cardiovascular:  RRR  Abdomen:  soft, minimally distended; appropriately tender near incision; no rebound or guarding  Fundus:  firm, below the umbilicus  Incision:  clean, dry and intact  Lower extremities:  no significant edema      Lab Review:    Hemoglobin   Date Value Ref Range Status   2020 10.9 (L) 11.7 - 15.7 g/dL Final           Assessment: 28yo  POD#2 s/p primary  for persistent cat II FHT, doing well.      Plan:  - Post-op: recovering well. Pain well controlled. Cont PO pain meds and regular diet. Encourage ambulation.  - GHTN: Bps normal; no s/sx of pre-E.   - Acute blood loss anemia: Asymptomatic, VSS. Cont PO iron supplement.  - Contraception: declines at this time  - Dispo: anticipate DC home tomorrow      Meredith Chan, MD [facility]  2020

## 2020-09-02 ENCOUNTER — LACTATION ENCOUNTER (OUTPATIENT)
Age: 30
End: 2020-09-02

## 2020-09-02 VITALS
HEIGHT: 64 IN | DIASTOLIC BLOOD PRESSURE: 73 MMHG | WEIGHT: 256 LBS | BODY MASS INDEX: 43.71 KG/M2 | HEART RATE: 84 BPM | OXYGEN SATURATION: 99 % | TEMPERATURE: 98.5 F | SYSTOLIC BLOOD PRESSURE: 143 MMHG | RESPIRATION RATE: 18 BRPM

## 2020-09-02 PROCEDURE — 25000132 ZZH RX MED GY IP 250 OP 250 PS 637: Performed by: OBSTETRICS & GYNECOLOGY

## 2020-09-02 RX ADMIN — ACETAMINOPHEN 975 MG: 325 TABLET, FILM COATED ORAL at 05:30

## 2020-09-02 RX ADMIN — IBUPROFEN 800 MG: 800 TABLET ORAL at 05:30

## 2020-09-02 RX ADMIN — FERROUS SULFATE TAB 325 MG (65 MG ELEMENTAL FE) 325 MG: 325 (65 FE) TAB at 10:08

## 2020-09-02 RX ADMIN — IBUPROFEN 800 MG: 800 TABLET ORAL at 11:47

## 2020-09-02 NOTE — LACTATION NOTE
This note was copied from a baby's chart.  LC follow up prior to discharge.  Nursing has been more consistent over the last 24 hours.  Her milk is coming in and infant weight is stable, slightly more weight loss, but not a large jump.  She is using a Haakaa for her pump.  LC cautioned against placing the Haakaa prior to nursing her baby. She can use the Haakaa once her baby is back to birthweight, or can place it on the first side she fed, however, it should not be used to draw off milk prior to nursing her baby when infant is still losing weight.  RAJ described this process in detail and if any feeding concerns, she should use the double electric pump for added milk supplementation. Overall, nursing is going well, so no concerns. She is aware she may call prn.

## 2020-09-02 NOTE — PROGRESS NOTES
[facility]   Brief Post-partum Note    Name:  Mary Lester  MRN: [id number]    S: Patient states she is doing well.  Pain is well controlled, more sore yesterday than the first day.  Tolerating regular diet without nausea or vomiting.  Ambulating without dizziness.  Voiding spontaneously, passing flatus but no bowel movement as of yet. Lochia  less than menses.  Breast feeding.  Unsure regarding contraception.  Plans discharge today.    O:   Patient Vitals for the past 24 hrs:   BP Temp Temp src Pulse Resp   20 2354 125/67 98  F (36.7  C) Oral 81 16   20 2203 -- -- -- -- 18   20 1611 126/61 98.1  F (36.7  C) Oral 87 18     Gen:  Resting comfortably, NAD  CV:  Regular rate and rhythm   Pulm:  Non-labored breathing.  No cough or wheezing.   Abd:  Obese, soft, appropriately tender to palpation, non-distended.  Fundus at -1 umbilicus, firm and non-tender.  Incision: Overlying steri-strips in placed with scant amount of dried serosanguinous drainage.  Wound edges well approximately, no findings of erythema or induration.  Ext:  Non-tender, 1+ LE edema b/l    Assessment/Plan:  29 year old  on POD #3 s/p PLTCS for fetal intolerance of labor following IOL for gHTN.  Continue with routine postpartum management.     Pregnancy c/b:   - gHTN; patient asymptomatic and normotensive since delivery     Pain: Well-controlled with oral medications  Hgb: QBL 519cc> 10.9. VSS as noted above, asymptomatic.   GI:  BID Senna/Colace.  PRN Simethicone.   PPx:  Encouraged ambulation   Rh: Positive  Rubella: Immune  Feed: Breast and bottle   BC: Undecided    Dispo: Plan for home today    Brittani Palacio MD   Pager: [phone]   2020

## 2020-09-02 NOTE — DISCHARGE INSTRUCTIONS
Postop  Birth Instructions  Follow up in clinic in 6 weeks   Lactation   Preeclampsia   Call your doctor right away if you have any of the following:  - Edema (swelling) in your face or hands  - Rapid weight gain-about 1 pound or more in a day  - Headache  - Abdominal pain on your right side  - Vision problems (flashes or spots)  - You have questions or concerns once you return home.    Activity       Do not lift more than 10 pounds for 6 weeks after surgery.  Ask family and friends for help when you need it.    No driving until you have stopped taking your pain medications (usually two weeks after surgery).    No heavy exercise or activity for 6 weeks.  Don't do anything that will put a strain on your surgery site.    Don't strain when using the toilet.  Your care team may prescribe a stool softener if you have problems with your bowel movements.     To care for your incision:       Keep the incision clean and dry.    Do not soak your incision in water. No swimming or hot tubs until it has fully healed. You may soak in the bathtub if the water level is below your incision.    Do not use peroxide, gel, cream, lotion, or ointment on your incision.    Adjust your clothes to avoid pressure on your surgery site (check the elastic in your underwear for example).     You may see a small amount of clear or pink drainage and this is normal.  Check with your health care provider:       If the drainage increases or has an odor.    If the incision reddens, you have swelling, or develop a rash.    If you have increased pain and the medicine we prescribed doesn't help.    If you have a fever above 100.4 F (38 C) with or without chills when placing thermometer under your tongue.   The area around your incision (surgery wound), will feel numb.  This is normal. The numbness should go away in less than a year.     Keep your hands clean:  Always wash your hands before touching your incision (surgery wound). This  helps reduce your risk of infection. If your hands aren't dirty, you may use an alcohol hand-rub to clean your hands. Keep your nails clean and short.    Call your healthcare provider if you have any of these symptoms:       You soak a sanitary pad with blood within 1 hour, or you see blood clots larger than a golf ball.    Bleeding that lasts more than 6 weeks.    Vaginal discharge that smells bad.    Severe pain, cramping or tenderness in your lower belly area.    A need to urinate more frequently (use the toilet more often), more urgently (use the toilet very quickly), or it burns when you urinate.    Nausea and vomiting.    Redness, swelling or pain around a vein in your leg.    Problems breastfeeding or a red or painful area on your breast.    Chest pain and cough or are gasping for air.    Problems with coping with sadness, anxiety or depression. If you have concerns about hurting yourself or the baby, call your provider immediately.      You have questions or concerns after you return home.

## 2020-09-02 NOTE — PLAN OF CARE
/73 this am reviewed by MD , no concerns may discharge to home today routine follow up in 6 weeks.Independent with self cares. Incision well approximated, steri strips intact. Patient will remove steri strips  as advised by MD.Home medications given. Meeting all expected goals for discharge. Discharge instructions reviewed, left unit at 12 midday.

## 2020-09-02 NOTE — PLAN OF CARE
Pt. vitals stable. Pain managed with scheduled tylenol and ibuprofen. Incision MAGDALENA, without signs of infection. Voiding adequately. Ambulating well. Independent in personal and infant cares. Breastfeeding infant. Using hydrogels and mother's love for nipple tenderness. Attentive to infant needs and bonding well with infant. FOB at bedside, supportive.

## 2020-09-02 NOTE — PLAN OF CARE
VSS, voiding without difficulty. Uterus and bleeding WNL. Pain well-controlled by Tylenol and ibuprofen. Up ad viviana. Independent with self and  cares. Breastfeeding and bonding well with baby.  present and supportive. Anticipating discharge today.    Patient verbalized that she feels like the food she ate was not moving/ being digested. Patient said she has been passing flatus and has had a bowel movement since delivery. Normal bowel sounds noted. Use of warm pack for her abdomen was offered but patient declined.

## 2020-09-02 NOTE — DISCHARGE SUMMARY
[facility] Discharge Summary    Mary Lester MRN# [id number]   Age: 29 year old YOB: 1990     Date of Admission:  2020  Date of Discharge::  2020   Admitting Physician:  Earnest Espana  Discharge Physician:  Brittani Barrera MD             Admission Diagnoses:   1. Intrauterine pregnancy at 39w0d  2. Gestational hypertension  3. Class 2 obesity          Discharge Diagnosis:     1.  delivery at 39w1d  2. Fetal intolerance of labor, remote from delivery           Procedures: 1.   Primary low transverse  section via pfannenstiel skin incision with two layer uterine closure           Medications Prior to Admission:     Medications Prior to Admission   Medication Sig Dispense Refill Last Dose     omeprazole (PRILOSEC) 20 MG DR capsule Take 20 mg by mouth daily        Prenatal Vit-Fe Fumarate-FA (PRENATAL VITAMIN PO) 1 tablet by Oral or Feeding Tube route daily                Discharge Medications:        Review of your medicines      START taking      Dose / Directions   acetaminophen 325 MG tablet  Commonly known as:  TYLENOL      Dose:  975 mg  Take 3 tablets (975 mg) by mouth every 6 hours  Quantity:     Refills:  0     bisacodyl 10 MG suppository  Commonly known as:  DULCOLAX      Dose:  10 mg  Place 1 suppository (10 mg) rectally daily as needed for constipation  Quantity:     Refills:  0     diphenhydrAMINE 25 MG capsule  Commonly known as:  BENADRYL      Dose:  25 mg  Take 1 capsule (25 mg) by mouth every 6 hours as needed for itching or sleep  Quantity:     Refills:  0     ferrous sulfate 325 (65 Fe) MG tablet  Commonly known as:  FEROSUL      Dose:  325 mg  Take 1 tablet (325 mg) by mouth daily  Quantity:  30 tablet  Refills:  3     hydrocortisone 2.5 % cream      Place rectally 3 times daily as needed (hemorrhoids)  Quantity:     Refills:  0     ibuprofen 800 MG tablet  Commonly known as:  ADVIL/MOTRIN      Dose:  800 mg  Take 1 tablet (800 mg) by mouth  every 6 hours  Quantity:  30 tablet  Refills:  3     lanolin ointment      Apply topically every hour as needed for dry skin (soreness)  Quantity:     Refills:  0     oxyCODONE 5 MG tablet  Commonly known as:  ROXICODONE      Dose:  5 mg  Take 1 tablet (5 mg) by mouth every 4 hours as needed (pain control or improvement in physical function. Hold dose for analgesic side effects.)  Quantity:  20 tablet  Refills:  0     senna-docusate 8.6-50 MG tablet  Commonly known as:  SENOKOT-S/PERICOLACE      Dose:  1 tablet  Take 1 tablet by mouth 2 times daily  Quantity:  30 tablet  Refills:  3     simethicone 80 MG chewable tablet  Commonly known as:  MYLICON      Dose:  80 mg  Take 1 tablet (80 mg) by mouth 4 times daily as needed (gas)  Quantity:     Refills:  0        CONTINUE these medicines which have NOT CHANGED      Dose / Directions   omeprazole 20 MG DR capsule  Commonly known as:  priLOSEC      Dose:  20 mg  Take 20 mg by mouth daily  Refills:  0     PRENATAL VITAMIN PO      Dose:  1 tablet  1 tablet by Oral or Feeding Tube route daily  Refills:  0           Where to get your medicines      These medications were sent to [facility] [id number] [address] [address]    Phone:  [phone]     ferrous sulfate 325 (65 Fe) MG tablet    ibuprofen 800 MG tablet    senna-docusate 8.6-50 MG tablet     Some of these will need a paper prescription and others can be bought over the counter. Ask your nurse if you have questions.    Bring a paper prescription for each of these medications    oxyCODONE 5 MG tablet  You don't need a prescription for these medications    acetaminophen 325 MG tablet    bisacodyl 10 MG suppository    diphenhydrAMINE 25 MG capsule    hydrocortisone 2.5 % cream    lanolin ointment    simethicone 80 MG chewable tablet                Consultations:   None         Brief History of Labor or Admission:    Ms. Lester is a 29-year-old, G1  P0 who presented for induction for chronic hypertension and underwent 5 doses of Cytotec and began dilating and gian at a proper interval.  Cervix was 1 cm; however, baby had multiple decels including recurrent decels in spite of position management, fluid hydration.  Decision was decided to have a .     Delivery Summary    Mary Lester MRN# [id number]   Age: 29 year old YOB: 1990          Labor Events     steroids:  None  Labor Type:  Induction/Cervical ripening  Predominate monitoring during 1st stage:  continuous electronic fetal monitoring     Antibiotics received during labor?:  No     Rupture date/time: 20 1102   Rupture type:  Spontaneous rupture of membranes prior to induction  Fluid color:  Clear  Fluid odor:  Normal     Induction:  Misoprostol  Induction date/time:     Cervical ripening date/time:     Indications for induction:  Hypertension     1:1 continuous labor support provided by?:  RN       Delivery/Placenta Date and Time    Delivery Date:  20 Delivery Time:   9:50 PM      Apgars    Living status:  Living   1 Minute 5 Minute 10 Minute 15 Minute 20 Minute   Skin color: 0  1       Heart rate: 2  2       Reflex irritability: 2  2       Muscle tone: 2  2       Respiratory effort: 1  2       Total: 7  9       Apgars assigned by:  JEAN-PIERRE GONCALVES     Cord    Vessels:  3 Vessels        Resuscitation    Methods:  None   Care at Delivery:  Called to attend this unscheduled  for fetal distress.  Infant was delivered and stimulated on the mother's abdomen.  She did cry briefly before being brought to the pre warmed radiant warmer.  She was again dried and stimulated and bulb suctioned for a moderate amount of clear thicker secretions. She then cried and continued to cry.  She became pink in room air by ~2 minutes of life. Breath sounds were clearing bilaterally with good aeration.  No grunting, flaring or retractions.  She was  "alert and active.  Routine care assumed by L&D staff.  IVANNA Singletary, CNNP 2020 10:02 PM  Output in Delivery Room:  Voided      Measurements    Weight:  7 lb 5.5 oz Length:  1' 7\"   Head circumference:  33 cm       Skin to Skin and Feeding Plan    Skin to skin initiation date/time: 1841    Skin to skin with:  Mother  Skin to skin end date/time:        Delivery (Maternal) (Provider to Complete) ([id number])       Blood Loss  Mother: Mary Lester #[id number]   Start of Mother's Information    IO Blood Loss  20 2146 - 20 2150    Total Surgical QBL Blood Loss (mL) Hospital Encounter 519 mL    Total  519 mL         End of Mother's Information  Mother: Mary Lester #[id number]                   Post-op Course:   The patient's post-op course was unremarkable.  She recovered as anticipated and experienced no post-operative complications.  On discharge, her pain was well controlled. Vaginal bleeding is similar to peak menstrual flow.  Voiding without difficulty.  Ambulating well and tolerating a normal diet.  No fever or significant wound drainage.  Breastfeeding well.  Infant is stable. She was discharged on post-partum day #3.  Undecided regarding birth control.    Post-partum hemoglobin:   Hemoglobin   Date Value Ref Range Status   2020 10.9 (L) 11.7 - 15.7 g/dL Final             Discharge Instructions and Follow-Up:     Discharge diet: Regular   Discharge activity: No heavy lifting more than 20 pounds, pushing, pulling for 6 week(s)  No driving or operating machinery while on narcotic analgesics  Pelvic rest: abstain from intercourse and do not use tampons for 6 week(s)   Discharge follow-up: Routine postpartum visit in 6 weeks   Wound care: Keep wound clean and dry  May shower but do not soak incision or scrub  Steri-strips off in 3-4 days           Discharge Disposition:     Discharged to home      Brittani Palacio MD   Pager: [phone]   2020      "

## 2020-09-02 NOTE — PLAN OF CARE
Denies need for W/C, ambulate off unit with baby in carseat and personal belongings to awaiting car escorted by Sophie SHAY

## 2020-09-02 NOTE — PROVIDER NOTIFICATION
09/02/20 0908   Provider Notification   Provider Name/Title DR Brittani Wellington   Method of Notification In Department   Notification Reason Status Update   Critical SBAR   Whiteboard Notes /73   Comments   Comments no concerns may discharge to home

## 2020-09-02 NOTE — PLAN OF CARE
Discharge instructions completed.  Patient states she understands all discharge instructions and all her questions have been answered.  Verbalizes when she needs to return to clinic for follow up for herself and baby.  She is caring for herself and her baby independently.  Prescriptions reviewed and handed to patient. Understands when to take meds next.  Postpartum depression symptoms reviewed and encouraged frequent review of depression scale.

## 2021-01-04 ENCOUNTER — HEALTH MAINTENANCE LETTER (OUTPATIENT)
Age: 31
End: 2021-01-04

## 2021-04-25 ENCOUNTER — HEALTH MAINTENANCE LETTER (OUTPATIENT)
Age: 31
End: 2021-04-25

## 2021-08-13 NOTE — LACTATION NOTE
This note was copied from a baby's chart.  LC follow up.  Infant had gone a long stretch earlier today without nursing and has not been using the nipple shield.   provided education and encouraged use of the nipple shield to assist with milk transfer and more consistent nursing patterns.  If infant is struggling to nurse, has short feeds, or jaundice level increases,  recommends initiation of pumping post feeding attempts and supplementing with EBM.  Excellent feeding pattern was observed with use of the shield and frequent swallowing.     / Black

## 2021-10-10 ENCOUNTER — HEALTH MAINTENANCE LETTER (OUTPATIENT)
Age: 31
End: 2021-10-10

## 2022-05-22 ENCOUNTER — HEALTH MAINTENANCE LETTER (OUTPATIENT)
Age: 32
End: 2022-05-22

## 2022-09-18 ENCOUNTER — HEALTH MAINTENANCE LETTER (OUTPATIENT)
Age: 32
End: 2022-09-18

## 2023-06-04 ENCOUNTER — HEALTH MAINTENANCE LETTER (OUTPATIENT)
Age: 33
End: 2023-06-04

## (undated) DEVICE — SOL WATER IRRIG 1000ML BOTTLE 2F7114

## (undated) DEVICE — GLOVE PROTEXIS POWDER FREE SMT 8.0  2D72PT80X

## (undated) DEVICE — PAD CHUX UNDERPAD 30X36" P3036C

## (undated) DEVICE — DRSG STERI STRIP 1/2X4" R1547

## (undated) DEVICE — SOL NACL 0.9% IRRIG 1000ML BOTTLE 2F7124

## (undated) DEVICE — LINEN BABY BLANKET 5434

## (undated) DEVICE — PACK C-SECTION LF PL15OTA83B

## (undated) DEVICE — LINEN DRAPE 54X72" 5467

## (undated) DEVICE — STOCKING SLEEVE VASOPRESS COMPRESSION CALF MED 18" VP501M

## (undated) DEVICE — ESU GROUND PAD ADULT W/CORD E7507

## (undated) DEVICE — BAG CLEAR TRASH 1.3M 39X33" P4040C

## (undated) DEVICE — TRANSFER DEVICE BLOOD NDL HOLDER 364880

## (undated) DEVICE — SU VICRYL 0 CT-1 36" J346H

## (undated) DEVICE — SU VICRYL 4-0 PS-2 18" UND J496H

## (undated) DEVICE — SU PDS II 0 CT 36" Z358T

## (undated) DEVICE — GLOVE PROTEXIS BLUE W/NEU-THERA 6.5  2D73EB65

## (undated) DEVICE — BLADE CLIPPER SGL USE 9680

## (undated) DEVICE — GLOVE PROTEXIS POWDER FREE SMT 7.5  2D72PT75X

## (undated) DEVICE — PREP CHLORAPREP 26ML TINTED HI-LITE ORANGE 930815

## (undated) DEVICE — LINEN FULL SHEET 5511

## (undated) DEVICE — GLOVE PROTEXIS MICRO 6.5  2D73PM65

## (undated) DEVICE — SU VICRYL 1 CTX 36" J371H

## (undated) DEVICE — CATH TRAY FOLEY SURESTEP 16FR DRAIN BAG STATOCK A899916

## (undated) DEVICE — CAP BABY PINK/BLUE IC-2

## (undated) DEVICE — SOLIDIFIER FLUID RED 1500ML LIQUID KIT SYS POWDER ISOB1500

## (undated) DEVICE — SOL ADH LIQUID BENZOIN SWAB 0.6ML C1544

## (undated) DEVICE — SUCTION CANISTER MEDIVAC LINER 3000ML W/LID 65651-530

## (undated) DEVICE — LINEN HALF SHEET 5512

## (undated) DEVICE — BLADE KNIFE SURG 10 371110

## (undated) DEVICE — SU VICRYL 3-0 CT-1 36" J344H

## (undated) DEVICE — LINEN TOWEL PACK X10 5473

## (undated) RX ORDER — OXYTOCIN/0.9 % SODIUM CHLORIDE 30/500 ML
PLASTIC BAG, INJECTION (ML) INTRAVENOUS
Status: DISPENSED
Start: 2020-08-30

## (undated) RX ORDER — ONDANSETRON 2 MG/ML
INJECTION INTRAMUSCULAR; INTRAVENOUS
Status: DISPENSED
Start: 2020-08-30

## (undated) RX ORDER — MORPHINE SULFATE 1 MG/ML
INJECTION, SOLUTION EPIDURAL; INTRATHECAL; INTRAVENOUS
Status: DISPENSED
Start: 2020-08-30